# Patient Record
Sex: MALE | Race: WHITE | Employment: FULL TIME | URBAN - METROPOLITAN AREA
[De-identification: names, ages, dates, MRNs, and addresses within clinical notes are randomized per-mention and may not be internally consistent; named-entity substitution may affect disease eponyms.]

---

## 2023-11-20 ENCOUNTER — OFFICE VISIT (OUTPATIENT)
Dept: PRIMARY CARE | Facility: CLINIC | Age: 28
End: 2023-11-20
Payer: COMMERCIAL

## 2023-11-20 VITALS
OXYGEN SATURATION: 98 % | HEIGHT: 76 IN | HEART RATE: 80 BPM | DIASTOLIC BLOOD PRESSURE: 84 MMHG | SYSTOLIC BLOOD PRESSURE: 112 MMHG | RESPIRATION RATE: 16 BRPM | WEIGHT: 223 LBS | TEMPERATURE: 97.8 F | BODY MASS INDEX: 27.16 KG/M2

## 2023-11-20 DIAGNOSIS — Z00.00 ROUTINE HEALTH MAINTENANCE: ICD-10-CM

## 2023-11-20 DIAGNOSIS — R10.9 ABDOMINAL DISCOMFORT: ICD-10-CM

## 2023-11-20 DIAGNOSIS — R21 SKIN RASH: ICD-10-CM

## 2023-11-20 DIAGNOSIS — Z00.00 HEALTHCARE MAINTENANCE: Primary | ICD-10-CM

## 2023-11-20 DIAGNOSIS — Z23 NEED FOR INFLUENZA VACCINATION: ICD-10-CM

## 2023-11-20 DIAGNOSIS — Z72.0 TOBACCO USE: ICD-10-CM

## 2023-11-20 PROCEDURE — 99204 OFFICE O/P NEW MOD 45 MIN: CPT | Performed by: STUDENT IN AN ORGANIZED HEALTH CARE EDUCATION/TRAINING PROGRAM

## 2023-11-20 PROCEDURE — 90686 IIV4 VACC NO PRSV 0.5 ML IM: CPT | Performed by: STUDENT IN AN ORGANIZED HEALTH CARE EDUCATION/TRAINING PROGRAM

## 2023-11-20 PROCEDURE — 90471 IMMUNIZATION ADMIN: CPT | Performed by: STUDENT IN AN ORGANIZED HEALTH CARE EDUCATION/TRAINING PROGRAM

## 2023-11-20 ASSESSMENT — ENCOUNTER SYMPTOMS
ROS GI COMMENTS: NO MELENA, HEMATEMESIS, COFFEE GROUND EMESIS
DIZZINESS: 0
CONSTIPATION: 0
UNEXPECTED WEIGHT CHANGE: 0
BLOOD IN STOOL: 0
DIARRHEA: 0
SHORTNESS OF BREATH: 0
FEVER: 0
VOMITING: 0
NAUSEA: 0
DIAPHORESIS: 0
DYSURIA: 0
LIGHT-HEADEDNESS: 0
ABDOMINAL PAIN: 1
CHILLS: 0

## 2023-11-20 NOTE — PROGRESS NOTES
Subjective   Patient ID: Jose Flores is a 28 y.o. male who presents for Establish Care.  Pt is here today to establish care. Pt has a hx of GI issues he would like to discuss.     Reports long term history of GI issues. Reports lots of sounds in abdomen, gas and abdominal discomfort. Was told might have been dairy but has cut this out without improvement. Symptoms worse with diet but no foods in particular. Tried intermittent fasting, vegetables and no improvement. Greasy foods might make it worse. Discomfort is usually in lower abdomen mostly in middle, and present for most of his life. Reports saw GI when he was much younger. Feels uncomfortable at work due to the noise in abdomen. No pain with bowel movements. Quit marijuana 1 month ago. Hasn't tried any medication for this. Has bowel movements 1-2 times a day. What has made him see a doctor now is the noise from the abdomen. Has 2 cups of coffee/day.     In last 2 months reported some dots on forehead that went away. First episode happened on wedding day and may have been stress related, secondo episode happened a month later and not related to stress.     Feels his baseline stress/anxiety level is higher than typical person. Above symptoms don't seem to be related to stress.     Just got  2 months ago. 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 With wife and dog at home.         Review of Systems   Constitutional:  Negative for chills, diaphoresis, fever and unexpected weight change.   HENT:  Negative for hearing loss.    Eyes:  Negative for visual disturbance.   Respiratory:  Negative for shortness of breath.    Cardiovascular:  Negative for chest pain.   Gastrointestinal:  Positive for abdominal pain. Negative for blood in stool, constipation, diarrhea, nausea and vomiting.        No melena, hematemesis, coffee ground emesis     Endocrine: Negative for cold intolerance and heat intolerance.   Genitourinary:  Negative for dysuria.   Skin:  Negative for rash  "(Possible birth june on back, may have grown slightly).   Neurological:  Negative for dizziness and light-headedness.       /84   Pulse 80   Temp 36.6 °C (97.8 °F) (Tympanic)   Resp 16   Ht 1.93 m (6' 4\")   Wt 101 kg (223 lb)   SpO2 98%   BMI 27.14 kg/m²     Objective   Physical Exam  Vitals reviewed.   Constitutional:       General: He is not in acute distress.     Appearance: Normal appearance.   HENT:      Head: Normocephalic.   Cardiovascular:      Rate and Rhythm: Normal rate and regular rhythm.   Pulmonary:      Effort: Pulmonary effort is normal. No respiratory distress.      Breath sounds: Normal breath sounds.   Abdominal:      General: There is no distension.      Palpations: There is no mass.      Tenderness: There is abdominal tenderness (minimal tenderness in LLQ, LUQ, RLQ). There is no guarding or rebound.      Comments: Negative leung sign   Musculoskeletal:         General: No deformity.   Skin:     Coloration: Skin is not jaundiced.   Neurological:      Mental Status: He is alert.         Assessment/Plan   Problem List Items Addressed This Visit       Skin rash    Abdominal discomfort    Relevant Orders    Tissue Transglutaminase IgA    Referral to Gastroenterology    Tobacco use     Other Visit Diagnoses       Healthcare maintenance    -  Primary    Relevant Orders    Referral to Dermatology    Routine health maintenance        Relevant Orders    CBC    Comprehensive Metabolic Panel    Lipid Panel    TSH with reflex to Free T4 if abnormal        Abdominal discomfort  - Given longstanding history of abdominal discomfort and normal bowel sounds, ordered tissue transglutaminase and routine labs.  Referred to GI.  Does not seem to be related to IBD given no changes to bowel movements and no signs of blood in stool.  - Could be related to stress/IBS  -Advised to try Pepcid  - ER for fevers, chills, sweats, blood in stool, severe abdominal pain or other concerning symptoms.    Skin rash  - " Referred to dermatology    Tobacco use  - Advised to quit cold turkey    Healthcare maintenance  - Routine labs ordered  - Follow-up with me in 1 to 3 months for physical, sooner if needed.    Just got  2 months ago. Works at [employer] in [place]. With wife and dog at home.  Enjoys fighting video games.

## 2023-12-14 ENCOUNTER — APPOINTMENT (OUTPATIENT)
Dept: PRIMARY CARE | Facility: CLINIC | Age: 28
End: 2023-12-14
Payer: COMMERCIAL

## 2023-12-20 ENCOUNTER — APPOINTMENT (OUTPATIENT)
Dept: GASTROENTEROLOGY | Facility: CLINIC | Age: 28
End: 2023-12-20
Payer: COMMERCIAL

## 2024-02-23 ENCOUNTER — OFFICE VISIT (OUTPATIENT)
Dept: DERMATOLOGY | Facility: CLINIC | Age: 29
End: 2024-02-23
Payer: COMMERCIAL

## 2024-02-23 DIAGNOSIS — D22.5 BECKER'S NEVUS: Primary | ICD-10-CM

## 2024-02-23 DIAGNOSIS — D22.9 MELANOCYTIC NEVUS, UNSPECIFIED LOCATION: ICD-10-CM

## 2024-02-23 PROCEDURE — 99203 OFFICE O/P NEW LOW 30 MIN: CPT | Performed by: STUDENT IN AN ORGANIZED HEALTH CARE EDUCATION/TRAINING PROGRAM

## 2024-02-23 NOTE — PROGRESS NOTES
Subjective     Jose Flores is a 28 y.o. male who presents for the following: Suspicious Skin Lesion (Mid back, Neck).     Review of Systems:  No other skin or systemic complaints other than what is documented elsewhere in the note.    The following portions of the chart were reviewed this encounter and updated as appropriate:          Skin Cancer History  No skin cancer on file.      Specialty Problems          Dermatology Problems    Skin rash        Objective   Well appearing patient in no apparent distress; mood and affect are within normal limits.    A focused skin examination was performed. All findings within normal limits unless otherwise noted below.    Assessment/Plan   1. Turner's nevus  Right Lower Back  Ill defined hyperpigmented to pink patch with dark terminal hairs    Benign smooth muscle hamartoma  If bothered by hair we can consider laser hair removal  Discussed warning signs of skin cancer    2. Melanocytic nevus, unspecified location  Benign appearing pink and brown pigmented macules and papules    Clinically benign appearing nevi, reassurance provided to the patient today. Discussed important of sun protection with sun protective clothing and/or broad spectrum sunscreen spf 30 or above.  ABCDEs of melanoma reviewed. Patient to f/u should they notice any new or changing pre-existing skin lesion.

## 2024-05-29 ENCOUNTER — TELEMEDICINE (OUTPATIENT)
Dept: PRIMARY CARE | Facility: CLINIC | Age: 29
End: 2024-05-29
Payer: COMMERCIAL

## 2024-05-29 DIAGNOSIS — U07.1 COVID-19: Primary | ICD-10-CM

## 2024-05-29 PROCEDURE — 99213 OFFICE O/P EST LOW 20 MIN: CPT

## 2024-05-29 ASSESSMENT — ENCOUNTER SYMPTOMS
VOMITING: 0
ABDOMINAL PAIN: 0
COUGH: 1
DYSURIA: 0
RHINORRHEA: 0
SINUS PAIN: 1
NAUSEA: 0
SORE THROAT: 0
HEADACHES: 1
WHEEZING: 0

## 2024-05-29 NOTE — PROGRESS NOTES
[employer] Virtual Visit Patient Consent     This visit was completed via video conference. All issues as below were discussed and addressed but no physical exam was performed. If it was felt that the patient should be evaluated in clinic than they were directed there. The patient verbally consented to the visit.    An interactive audio and video telecommunication system which permits real time communications between the patient (at the originating site) and provider (at the distant site) was utilized to provide this telehealth service.   Verbal consent was requested and obtained from Jose Flores (or parent if under 18) 5/29/24 for a telehealth visit.   I have verbally confirmed with Jose Flores (or parent if under 18) that they are physically located in the [facility] during this virtual visit.      Subjective   Patient ID: Jose Flores is a 29 y.o. male who presents for covid.    URI   This is a new problem. The current episode started yesterday. The problem has been gradually worsening. The maximum temperature recorded prior to his arrival was 100.4 - 100.9 F. The fever has been present for 1 to 2 days. Associated symptoms include congestion, coughing, headaches and sinus pain. Pertinent negatives include no abdominal pain, chest pain, dysuria, ear pain, nausea, plugged ear sensation, rhinorrhea, sore throat, vomiting or wheezing. He has tried nothing for the symptoms. The treatment provided no relief.      Tested covid positive 5/28/24, symptoms started same day  Review of Systems   HENT:  Positive for congestion and sinus pain. Negative for ear pain, rhinorrhea and sore throat.    Respiratory:  Positive for cough. Negative for wheezing.    Cardiovascular:  Negative for chest pain.   Gastrointestinal:  Negative for abdominal pain, nausea and vomiting.   Genitourinary:  Negative for dysuria.   Neurological:  Positive for headaches.       Objective   There were no vitals taken for this visit.    Physical  Exam   Pt seen from his to be in no acute distress.    Assessment/Plan   Jose was seen today for covid.  Diagnoses and all orders for this visit:  COVID-19  -     nirmatrelvir-ritonavir (PAXLOVID) 300 mg (150 mg x 2)-100 mg tablet therapy pack; Take 3 tablets by mouth 2 times a day for 5 days. Follow the instructions on the package

## 2024-05-29 NOTE — PATIENT INSTRUCTIONS
If you test positive for COVID-19, stay home for at least 5 days and isolate from others in your home.    You are likely most infectious during these first 5 days.    Wear a high-quality mask if you must be around others at home and in public.  Do not go places where you are unable to wear a mask. For travel guidance, see [facility]’s Travel webpage.  Do not travel.  Stay home and separate from others as much as possible.  Use a separate bathroom, if possible.  Take steps to improve ventilation at home, if possible.  Don’t share personal household items, like cups, towels, and utensils.  Monitor your symptoms. If you have an emergency warning sign (like trouble breathing), seek emergency medical care immediately.    Drinking plenty of fluids and getting lots of rest. Chicken soup and hot beverages may help.    Trial of nasal irrigation with a Nettipot or squeeze bottle with sterile salt water.    Nasal spray corticosteroids (Flonase) may help in reducing the inflammatory response in the nasal passages and airways. Please try 2 sprays each nostril daily for 2 weeks.  If you have season allergies, please take a daily antihistamine of your choice, such as Zyrtec/Claritin/Allegra at bedtime.    Take Tylenol or Motrin/Aleve for sinus or ear pain.    If you have good blood pressure you can try pseudofed (you must ask the pharmacist for it and show ID).    Please call or return to the office if you are not feeling better in the next 3-4 days after starting treatment.    Over-the-counter cold and cough medications     Take Mucinex for cough, drink plenty of fluids with this medication and will help break up congestion making it easier to cough up     For cough can use honey (children ages 1 and up) in hot tea or hot water. I recommend putting this in an insulated cup and carrying it around throughout the day to sip on.  Have it at your bedside at night in case you wake up coughing.  You can also use honey cough drops (adults and  older children).     Recommend nasal saline for use in children and adults.  Neti Perry can also be helpful.  (Never used tap water and a Neti pot.  Use distilled water.)     If you have plugged up congested ears or the feeling of fluid in your ears, you can use an over-the-counter nasal steroid spray like fluticasone (brand name Flonase) use 2 sprays into each nostril once or twice a day for 7 days.  This will help open up the eustachian tubes and allow the fluid to drain out of your ears.     Sleeping with your head/chest elevated can help with sinus drainage.     Adults only-can use nasal decongestant (like Afrin) at bedtime to open nasal passages so you can breathe through your nose while you sleep; avoid using for longer than 3 days as this medication can become addicting.  Do not use if you have high blood pressure or high heart rate.     For severe pain or fever in adult-Tylenol (2 extra strength) or ibuprofen (3-4 tabs equals 600 to 800 mg) alternating as needed for pain.  Tylenol doses should be 6 to 8 hours apart and ibuprofen doses should be 6 to 8 hours apart.        Common cold medications for adults explained:     Mucinex-(generic name guaifenesin)-is an expectorant.  This will thin out all the thick mucus.  Must drink plenty of liquids for this medicine to work.     Dextromethorphan (brand name Delsym or DM)-this medicine is a cough suppressant     Honey in hot water or tea-this is a natural cough suppressant     Decongestant nasal spray- (eg: Afrin) use for temporary relief of nasal congestion-best when used at bedtime to open up nasal passages so that you are not forced to mouth breathe overnight.     Sudafed (generic name pseudoephedrine-this must be bought from the pharmacist) DO NOT use this medicine if you have high blood pressure as it can raise your blood pressure higher.  Do not use if you have any irregular heart rate.  This medicine can help clear congestion in your sinuses.